# Patient Record
Sex: MALE | Race: BLACK OR AFRICAN AMERICAN | NOT HISPANIC OR LATINO | Employment: PART TIME | URBAN - METROPOLITAN AREA
[De-identification: names, ages, dates, MRNs, and addresses within clinical notes are randomized per-mention and may not be internally consistent; named-entity substitution may affect disease eponyms.]

---
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
## 2023-03-08 ENCOUNTER — OFFICE VISIT (OUTPATIENT)
Dept: FAMILY MEDICINE | Facility: CLINIC | Age: 18
End: 2023-03-08
Payer: MEDICAID

## 2023-03-08 VITALS
RESPIRATION RATE: 16 BRPM | WEIGHT: 183 LBS | OXYGEN SATURATION: 98 % | DIASTOLIC BLOOD PRESSURE: 66 MMHG | SYSTOLIC BLOOD PRESSURE: 113 MMHG | HEART RATE: 57 BPM | TEMPERATURE: 98.4 F

## 2023-03-08 DIAGNOSIS — S39.012A ACUTE MYOFASCIAL STRAIN OF LUMBAR REGION, INITIAL ENCOUNTER: Primary | ICD-10-CM

## 2023-03-08 PROCEDURE — 99203 OFFICE O/P NEW LOW 30 MIN: CPT | Performed by: FAMILY MEDICINE

## 2023-03-08 NOTE — PATIENT INSTRUCTIONS
Alternate ice and heat to the low back up to 20 minutes at a time a few times a day  Heat before activity to loosen muscles and ice after to relieve inflammation    May take ibuprofen 400-600mg every 6-8 hours if needed for pain.     Limit physical activity to what you can do without pain    Resume workouts at the gym slowly - decrease weight and reps and build back up as tolerated. At risk to re-injure the back if doing too much.     Work on strengthening and stretching the back and abdominal core muscles. Start very slow, stop if pain.     Recheck if worse or no better.

## 2023-03-08 NOTE — LETTER
[facility]  [address] [address]  Phone: [phone]  Fax: [phone]    March 8, 2023        Ricky Dominguez  [address] [address]          To whom it may concern:    RE: Ricky Dominguez    Patient was seen and treated today at our clinic.    Please contact me for questions or concerns.      Sincerely,        Etelvina Rushing MD

## 2023-03-09 NOTE — PROGRESS NOTES
Assessment/Plan:   Acute myofascial strain of lumbar region, initial encounter  Acute low back pain triggered by pulling a heavy weight in the gym. Likely an acute myofascial strain. No bony pain or signs of nerve compression. Already improved quite a bit from the first day.     Alternate ice and heat to the low back up to 20 minutes at a time a few times a day  Heat before activity to loosen muscles and ice after to relieve inflammation    May take ibuprofen 400-600mg every 6-8 hours if needed for pain.     Limit physical activity to what you can do without pain    Resume workouts at the gym slowly - decrease weight and reps and build back up as tolerated. At risk to re-injure the back if doing too much.     Work on strengthening and stretching the back and abdominal core muscles. Start very slow, stop if pain.     Recheck if worse or no better.     I discussed red flag symptoms, return precautions to clinic/ER and follow up care with patient/parent.  Expected clinical course, symptomatic cares advised. Questions answered. Patient/parent amenable with plan.      Subjective:     Ricky Dominguez is a 17 year old male who presents with parent for evaluation of low back pain. This started abruptly 2 days ago while pulling a heavy weight in the work out gym. He felt a pop then pain in the low back. It is less intense now than it was initially. Still has pain with bending forward. No pain into the legs or abdomen. No numbness or weakness in legs. No change in bowel or bladder function.     No Known Allergies  No current outpatient medications on file.     No current facility-administered medications for this visit.     There is no problem list on file for this patient.      Objective:     /66   Pulse 57   Temp 98.4  F (36.9  C) (Oral)   Resp 16   Wt 83 kg (183 lb)   SpO2 98%     Physical    General Appearance: Alert, pleasant, no distress AVSS  Head: Normocephalic, without obvious abnormality,  atraumatic  Eyes: Conjunctivae are normal.   Neck: Normal range of motion without pain  Lungs: Clear to auscultation bilaterally, respirations unlabored  Heart: Regular rate and rhythm  Abdomen: Soft, non-tender  Back: no pain with percussion of the spine or CVA bilaterally. Soreness across the low back. No specific SI joint pain. Increased pain with bending forward, less with back extension and side to side is okay.   Extremities: No lower extremity edema. Normal BLE strength and DTRs. Negative SLR  Skin:  no rashes or lesions  Psychiatric: Patient has a normal mood and affect.         No results found for any visits on 03/08/23.    This note has been dictated in part using voice recognition software.  Any grammatical or context distortions are unintentional and inherent to the software.  Please feel free to contact me directly for clarification if needed.

## 2023-03-21 ENCOUNTER — OFFICE VISIT (OUTPATIENT)
Dept: FAMILY MEDICINE | Facility: CLINIC | Age: 18
End: 2023-03-21
Payer: MEDICAID

## 2023-03-21 VITALS
DIASTOLIC BLOOD PRESSURE: 71 MMHG | HEART RATE: 60 BPM | SYSTOLIC BLOOD PRESSURE: 122 MMHG | OXYGEN SATURATION: 97 % | TEMPERATURE: 97.9 F | WEIGHT: 179 LBS | RESPIRATION RATE: 14 BRPM

## 2023-03-21 DIAGNOSIS — L29.9 PRURITUS OF SKIN: ICD-10-CM

## 2023-03-21 DIAGNOSIS — L29.9 PRURITUS OF SCALP: Primary | ICD-10-CM

## 2023-03-21 PROCEDURE — 99213 OFFICE O/P EST LOW 20 MIN: CPT | Performed by: PHYSICIAN ASSISTANT

## 2023-03-21 RX ORDER — KETOCONAZOLE 20 MG/ML
SHAMPOO TOPICAL
Qty: 120 ML | Refills: 0 | Status: SHIPPED | OUTPATIENT
Start: 2023-03-21

## 2023-03-21 NOTE — PATIENT INSTRUCTIONS
I have referred you to Dermatology for further evaluation  Ketoconazole shampoo should be used two to three times per week for two to four weeks in the initial treatment phase.

## 2023-03-21 NOTE — PROGRESS NOTES
Assessment & Plan           1. Pruritus of scalp  -Unclear the etiology of itchy scalp. Patient has no visible scales or erythema on the scalp. I have advised patient to start on the ketoconazole shampoo. Patient advised to follow up with Dermatology for further evaluation.  - Adult Dermatology Referral; Future  - ketoconazole (NIZORAL) 2 % external shampoo; Apply 5 to 10 mL of shampoo to scalp. Leave for three to five minutes before rinsing off. Use for 2-3 times a week for 2-4 weeks.  Dispense: 120 mL; Refill: 0'     Pruritus of face  -Face looks clear, no rash or lesions, acne.      Patient Instructions   I have referred you to Dermatology for further evaluation  Ketoconazole shampoo should be used two to three times per week for two to four weeks in the initial treatment phase.       Return if symptoms worsen or fail to improve, for Follow up.    At the end of the encounter, I discussed results, diagnosis, medications. Discussed red flags for immediate return to clinic/ER, as well as indications for follow up if no improvement. Patient understood and agreed to plan. Patient was stable for discharge.    Jonathan García is a 17 year old male who presents to clinic today for the following health issues:  Chief Complaint   Patient presents with     Derm Problem     Itchy - scalp and face for a year      HPI  Patient reports itchy scalp and face for a year.  Patient was seen 1 year ago by Dermatologist.  He notes taking a prescription pill for 3 months.  He cannot recall the name of the medication.  He reports symptoms did not improve after completion of treatment.  Patient notes he experiences itching only when he sweats.  This can happen after a session at the gym.  He has tried head and shoulders shampoo for the scalp without help. He uses shea moisture shampoo for years. He does not have a PCP.       Review of Systems    Problem List:  There are no relevant problems documented for this patient.      No  past medical history on file.    Social History     Tobacco Use     Smoking status: Never     Smokeless tobacco: Never   Substance Use Topics     Alcohol use: No           Objective    /71   Pulse 60   Temp 97.9  F (36.6  C)   Resp 14   Wt 81.2 kg (179 lb)   SpO2 97%   Physical Exam  Constitutional:       Appearance: Normal appearance.   HENT:      Head: Normocephalic.   Cardiovascular:      Rate and Rhythm: Normal rate and regular rhythm.   Pulmonary:      Effort: Pulmonary effort is normal.      Breath sounds: Normal breath sounds.   Musculoskeletal:      Cervical back: Normal range of motion and neck supple.   Lymphadenopathy:      Head:      Right side of head: No submental, submandibular or tonsillar adenopathy.      Left side of head: No submental, submandibular or tonsillar adenopathy.   Skin:     General: Skin is warm and dry.      Findings: No rash.      Comments: No fine scales or erythema on the scalp  No rash on the face   Neurological:      Mental Status: He is alert and oriented to person, place, and time.              Nargis Coburn PA-C

## 2024-10-22 ENCOUNTER — OFFICE VISIT (OUTPATIENT)
Dept: FAMILY MEDICINE | Facility: CLINIC | Age: 19
End: 2024-10-22
Payer: MEDICAID

## 2024-10-22 ENCOUNTER — ANCILLARY PROCEDURE (OUTPATIENT)
Dept: GENERAL RADIOLOGY | Facility: CLINIC | Age: 19
End: 2024-10-22
Attending: PHYSICIAN ASSISTANT
Payer: MEDICAID

## 2024-10-22 VITALS
SYSTOLIC BLOOD PRESSURE: 108 MMHG | OXYGEN SATURATION: 97 % | TEMPERATURE: 98.4 F | DIASTOLIC BLOOD PRESSURE: 54 MMHG | RESPIRATION RATE: 14 BRPM | HEART RATE: 65 BPM

## 2024-10-22 DIAGNOSIS — M25.571 PAIN IN JOINT INVOLVING ANKLE AND FOOT, RIGHT: ICD-10-CM

## 2024-10-22 DIAGNOSIS — S86.119A: Primary | ICD-10-CM

## 2024-10-22 PROCEDURE — 99213 OFFICE O/P EST LOW 20 MIN: CPT | Performed by: PHYSICIAN ASSISTANT

## 2024-10-22 PROCEDURE — 73610 X-RAY EXAM OF ANKLE: CPT | Mod: TC | Performed by: STUDENT IN AN ORGANIZED HEALTH CARE EDUCATION/TRAINING PROGRAM

## 2024-10-22 NOTE — PATIENT INSTRUCTIONS
Ice topically to the area 20 minutes on each hour while awake.  Take precautions to avoid damage to the skin.  After 2 days, transition to ice topically 20 minutes 3 times per day.  After 2 days may add heat and alternate ice and heat.  Ibuprofen 600 mg (3 tablets) 3 times a day with food for analgesia and anti-inflammatory effect.  Do not use the ibuprofen if you have contraindications to using NSAIDs.  Injuries to the extremities may be elevated above level of the heart continuously for the first 2 days as much as possible.  Use of over-the-counter neoprene slip on sleeve that is well fitted but not too tight to cut off circulation.  Return to see primary care provider or return to clinic for reexamination and carlos-ray in 2 weeks if anything less than 100% resolution or return to pain-free weightbearing and full activities.

## 2024-10-22 NOTE — PROGRESS NOTES
Patient presents with:  Ankle Problem: Was playing basketball yesterday and twisted rt ankle      Clinical Decision Making:  Patient had x-ray of the right ankle which did show a benign cystic lesion on the distal fibula that was unchanged since x-rays of 2019.  No other noted fracture or dislocation.  Patient has pain along the course the posterior tibial tendon and into the insertion.  Patient is treated for tendinitis and ankle sprain.  Activity modification and symptomatic care reviewed with the patient. Expected course of resolution and indication for return was gone over and questions were answered to patient/parent's satisfaction before discharge.        ICD-10-CM    1. Posterior tibial strain, initial encounter  S86.119A XR Ankle Right G/E 3 Views      2. Pain in joint involving ankle and foot, right  M25.571 XR Ankle Right G/E 3 Views          Patient Instructions   Ice topically to the area 20 minutes on each hour while awake.  Take precautions to avoid damage to the skin.  After 2 days, transition to ice topically 20 minutes 3 times per day.  After 2 days may add heat and alternate ice and heat.  Ibuprofen 600 mg (3 tablets) 3 times a day with food for analgesia and anti-inflammatory effect.  Do not use the ibuprofen if you have contraindications to using NSAIDs.  Injuries to the extremities may be elevated above level of the heart continuously for the first 2 days as much as possible.  Use of over-the-counter neoprene slip on sleeve that is well fitted but not too tight to cut off circulation.  Return to see primary care provider or return to clinic for reexamination and carlos-ray in 2 weeks if anything less than 100% resolution or return to pain-free weightbearing and full activities.        HPI:  Ricky Dominguez is a 19 year old male who presents today for right ankle sprain.  Patient had sustained an injury to the right ankle while playing basketball yesterday.  He did not have swelling or ecchymoses  or joint effusion to report.  No pain into the foot knee or hip of the ipsilateral side.  Patient has not tried treatment for this at home.    History obtained from chart review and the patient.    Problem List:  There are no relevant problems documented for this patient.      No past medical history on file.    Social History     Tobacco Use    Smoking status: Never    Smokeless tobacco: Never   Substance Use Topics    Alcohol use: No       Review of Systems  As above in HPI otherwise negative.    Vitals:    10/22/24 1058   BP: 108/54   Pulse: 65   Resp: 14   Temp: 98.4  F (36.9  C)   TempSrc: Oral   SpO2: 97%       General: Patient is resting comfortably no acute distress is afebrile  HEENT: Head is normocephalic atraumatic   eyes are PERRL EOMI sclera anicteric   Skin: Without rash non-diaphoretic  Musculoskeletal:  Pain along the course of the posterior tibial tendon along the medial aspect of the malleoli and into the insertion into the medial aspect of the forefoot.  No tenderness along the right lateral malleoli over the distal tibia.  Patient is able to bear weight on the ankle and ambulate into the office encounter.    Physical Exam      Labs:  Results for orders placed or performed in visit on 10/22/24   XR Ankle Right G/E 3 Views     Status: None    Narrative    EXAM: XR ANKLE RIGHT G/E 3 VIEWS  LOCATION: [facility]  DATE: 10/22/2024    INDICATION: right medial ankle pain and PTT strain  COMPARISON: 12/27/2019      Impression    IMPRESSION: No fracture. Intact ankle mortise and distal syndesmosis. Joint spaces are maintained. 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       Radiology:  I have personally ordered and preliminarily reviewed the following xray, I have noted no acute fractures and unchanged cystis bone lesion from 2019.     At the end of the encounter, I discussed results, diagnosis, medications. Discussed red flags  for immediate return to clinic/ER, as well as indications for follow up if no improvement. Patient understood and agreed to plan. Patient was stable for discharge.